# Patient Record
Sex: MALE | Race: WHITE | NOT HISPANIC OR LATINO | Employment: OTHER | URBAN - METROPOLITAN AREA
[De-identification: names, ages, dates, MRNs, and addresses within clinical notes are randomized per-mention and may not be internally consistent; named-entity substitution may affect disease eponyms.]

---

## 2019-07-18 ENCOUNTER — OFFICE VISIT (OUTPATIENT)
Dept: PODIATRY | Facility: CLINIC | Age: 44
End: 2019-07-18

## 2019-07-18 VITALS
DIASTOLIC BLOOD PRESSURE: 86 MMHG | HEIGHT: 69 IN | HEART RATE: 57 BPM | SYSTOLIC BLOOD PRESSURE: 133 MMHG | BODY MASS INDEX: 31.7 KG/M2 | WEIGHT: 214 LBS

## 2019-07-18 DIAGNOSIS — M76.61 ACHILLES TENDINITIS, RIGHT LEG: Primary | ICD-10-CM

## 2019-07-18 PROCEDURE — 99203 OFFICE O/P NEW LOW 30 MIN: CPT | Performed by: PODIATRIST

## 2019-07-18 RX ORDER — UBIDECARENONE 75 MG
50 CAPSULE ORAL DAILY
COMMUNITY

## 2019-07-18 RX ORDER — PHENOL 1.4 %
600 AEROSOL, SPRAY (ML) MUCOUS MEMBRANE DAILY
COMMUNITY

## 2019-07-18 RX ORDER — POTASSIUM CHLORIDE 1.5 G/1.77G
20 POWDER, FOR SOLUTION ORAL 2 TIMES DAILY
COMMUNITY

## 2019-07-18 NOTE — PROGRESS NOTES
07/18/2019  Foot and Ankle Surgery - New Patient   Provider: Dr. Jimenez Thompson DPM  Location: [facility]    Subjective:  Surinder Guerrero is a 44 y.o. male.     Chief Complaint   Patient presents with   • Right Ankle - Pain       HPI: Patient is a 44-year-old male that presents with history of right lower extremity discomfort.  He complains of pain all across the Achilles tendon over the last few months.  He denies any injury to the area.  He states that he did have fairly significant cramping and discomfort to the myotendinous junction region.  Symptoms have gradually improved because he has decreased his overall activity.  Today, he states that his pain is a 2 out of 10 but he is concerned about increasing activity for fear of tear or rupture.  He has not been formally treated for this issue.  He would like to gradually be able to return to baseline activity.    No Known Allergies    History reviewed. No pertinent past medical history.    Past Surgical History:   Procedure Laterality Date   • KNEE ARTHROSCOPY W/ ACL RECONSTRUCTION Right        Family History   Problem Relation Age of Onset   • Diabetes Maternal Grandfather        Social History     Socioeconomic History   • Marital status: Other     Spouse name: Not on file   • Number of children: Not on file   • Years of education: Not on file   • Highest education level: Not on file   Tobacco Use   • Smoking status: Never Smoker   Substance and Sexual Activity   • Alcohol use: Yes   • Drug use: No   • Sexual activity: Defer        Current Outpatient Medications on File Prior to Visit   Medication Sig Dispense Refill   • calcium carbonate (OS-LULÚ) 600 MG tablet Take 600 mg by mouth Daily.     • Ped Multivitamins-Fl-Iron (MULTIVITAMIN WITH FLUORIDE/IRON) 0.25-10 MG/ML solution solution Take  by mouth Daily.     • potassium chloride (KLOR-CON) 20 MEQ packet Take 20 mEq by mouth 2 (Two) Times a Day.     • vitamin B-12 (CYANOCOBALAMIN) 100 MCG tablet  "Take 50 mcg by mouth Daily.       No current facility-administered medications on file prior to visit.        Review of Systems:  General: Denies fever, chills, fatigue, and weakness.  Eyes: Denies vision loss, blurry vision, and excessive redness.  ENT: Denies hearing issues and difficulty swallowing.  Cardiovascular: Denies palpitations, chest pain, or syncopal episodes.  Respiratory: Denies shortness of breath, wheezing, and coughing.  GI: Denies abdominal pain, nausea, and vomiting.   : Denies frequency, hematuria, and urgency.  Musculoskeletal: + Right Achilles tendon discomfort  Derm: Denies rash, open wounds, or suspicious lesions.  Neuro: Denies headaches, numbness, loss of coordination, and tremors.  Psych: Denies anxiety and depression.  Endocrine: Denies temperature intolerance and changes in appetite.  Heme: Denies bleeding disorders or abnormal bruising.     Objective   /86   Pulse 57   Ht 175.3 cm (69\")   Wt 97.1 kg (214 lb)   BMI 31.60 kg/m²     General Exam  Appearance: appears stated age and healthy   Orientation: alert and oriented to person, place, and time   Affect: appropriate   Gait: unimpaired     Foot/Ankle Exam  Inspection and Palpation  Ecchymosis - Right: none   Tenderness - Right: (Mild discomfort at the level of the myotendinous junction of the Achilles tendon)   Swelling - Right: none     Arch - Right: normal   Hammertoes - Right: absent   Claw Toes - Right: absent   Mallet Toes - Right: absent   Hallux Valgus - Right: no   Hallux Limitus - Right: no   Skin - Right: skin intact      Vascular  Dorsalis Pedis - Right: 3+   Posterior Tibial - Right: 3+     Neurologic  Saphenous Nerve Sensation  - Right: normal   Tibial Nerve Sensation - Right: normal   Superficial Peroneal Nerve Sensation - Right: normal   Deep Peroneal Nerve Sensation - Right: normal   Sural Nerve Sensation - Right: normal   Achilles Reflex - Right: 2+     Muscle Strength  Dorsiflexion - Right: 5   Plantar " Flexion - Right: 5   Eversion - Right: 5   Inversion - Right: 5   Great Toe Extension - Right: 5   Great Toe Flexion - Right: 5     Range of Motion  Normal right ankle ROM    Right Foot/Ankle Comments  Prominent equinus contracture with knee extended and flexed.  Mild discomfort with palpation to the myotendinous junction.  No obvious defect or hypertrophy to the Achilles tendon.        Assessment/Plan   Surinder was seen today for pain.    Diagnoses and all orders for this visit:    Achilles tendinitis, right leg  -     Ambulatory Referral to Physical Therapy Evaluate and treat; Soft Tissue Mobilizaton; Stretching, Strengthening      Patient presents for recent onset of right lower leg pain.  He complains of most of the discomfort at the level of the Achilles tendon.  Today, he has no hypertrophy or defect concerning for tear or rupture.  I explained that his symptoms are likely secondary to well-developed posterior calf muscles.  We have reviewed proper stretching exercises at length.  I do feel that he would benefit from formal physical therapy and activity modification.  We did review low impact exercises.  He does understand his risk with high impact activities.  I reviewed rice therapy and proper use of anti-inflammatories.  I would like him to return in 6 weeks for reevaluation.    Orders Placed This Encounter   Procedures   • Ambulatory Referral to Physical Therapy Evaluate and treat; Soft Tissue Mobilizaton; Stretching, Strengthening     Referral Priority:   Routine     Referral Type:   Therapy     Referral Reason:   Specialty Services Required     Requested Specialty:   Physical Therapy     Number of Visits Requested:   1        Note is dictated utilizing voice recognition software. Unfortunately this leads to occasional typographical errors. I apologize in advance if the situation occurs. If questions occur please do not hesitate to call our office.

## 2019-07-23 ENCOUNTER — OFFICE VISIT (OUTPATIENT)
Dept: PHYSICAL THERAPY | Facility: CLINIC | Age: 44
End: 2019-07-23

## 2019-07-23 DIAGNOSIS — M76.61 ACHILLES TENDINITIS, RIGHT LEG: ICD-10-CM

## 2019-07-23 PROCEDURE — 97140 MANUAL THERAPY 1/> REGIONS: CPT | Performed by: PHYSICAL THERAPIST

## 2019-07-23 PROCEDURE — 97161 PT EVAL LOW COMPLEX 20 MIN: CPT | Performed by: PHYSICAL THERAPIST

## 2019-07-23 NOTE — PROGRESS NOTES
Physical Therapy Initial Evaluation and Plan of Care    Patient: Surinder Guerrero   : 1975  Diagnosis/ICD-10 Code:  No primary diagnosis found.  Referring practitioner: DANIELLE Thompson DPM  Date of Initial Visit: 2019  Today's Date: 2019  Patient seen for 1 sessions           Subjective Questionnaire: FAAM: 90%, sports subscale 29%      Subjective Evaluation    History of Present Illness  Mechanism of injury: Patient reports insidious onset of R achilles/calf pain in Dec 2018. Running 3.5 miles ~5x per week; participated in mini marathons etc. Pain with initial standing, usually went away with running. Worsened to point where hurt more constantly ~2019. Worse with hills. No issues with walking or stairs. Took 3 weeks off from running. He reports tightness at rest, muscle spasms. Hx includes ACL repair R knee, multiple sprained ankles. He stopped playing basketball due to fear of achilles injury. Better with ice and stretching. No change in shoewear (neutral running shoe) and is a forefoot runner, does not typically stretch but has been since seeing MD (ITB, HS, quad, runners stretch, gastroc stretch at stair).    MD follow up 6 weeks      Patient Occupation:  Quality of life: good    Pain  Current pain rating: 3  At best pain ratin  At worst pain ratin  Location: R achilles/calf  Quality: tight, pulling and dull ache  Relieving factors: ice and rest  Progression: improved    Social Support  Lives with: spouse    Patient Goals  Patient goals for therapy: decreased pain, increased motion, increased strength, independence with ADLs/IADLs and return to sport/leisure activities  Patient goal: return to running           Objective       Palpation     Right   Hypertonic in the lateral gastrocnemius. Tenderness of the medial gastrocnemius.     Additional Palpation Details          Tenderness     Right Ankle/Foot   Tenderness in the Achilles insertion.     Additional  Tenderness Details  Mid substance achilles R.  Fascia restriction noted throughout medial >lateral calf.     Neurological Testing     Sensation     Ankle/Foot     Right Ankle/Foot   Intact: light touch     Active Range of Motion   Left Ankle/Foot   Dorsiflexion (ke): 4 degrees   Dorsiflexion (kf): 5 degrees   Inversion: 41 degrees   Eversion: 18 degrees     Right Ankle/Foot   Dorsiflexion (ke): 0 degrees   Dorsiflexion (kf): 0 degrees     Additional Active Range of Motion Details  Knee lacking 7 degrees ext supine R.    Functional hip ER sitting WFL bilat. Gross screen WFL L knee bilat hip except mild hip ext restriction.     Joint Play     Additional Joint Play Details  Heel raise R able but weak and wobbly.   Navicular drop      Walking and stairs are ok  [place] walking are ok, running painful        Strength/Myotome Testing     Left Ankle/Foot   Dorsiflexion: 4+  Plantar flexion: 4+  Inversion: 4+  Eversion: 4+  Great toe flexion: 4+  Great toe extension: 4+    Right Ankle/Foot   Dorsiflexion: 4+  Plantar flexion: 4- (mild pain mid substance R calf)  Inversion: 4+  Eversion: 4+  Great toe extension: 4+    Additional Strength Details  LE strength 4+/5 throughout, hip ext 4-/5 bilat, hip abd strong bilat sidelying    Standing single heel rise L  X 5 with good control. R painfree with poor control x 3.    General Comments     Ankle/Foot Comments   No significant edema    Ambulation: no antalgia, unremarkable  Stairs: not observed  Jogging: will assess at later date.         Assessment & Plan     Assessment  Assessment details: Pt demonstrates fascia restrictions, ROM restrictions, flexibility impairments, muscle strength impairments, tenderness to palpation distal achilles insertion and mid substance musculotendinous junction R calf. Symptoms interfere with running and active lifestyle. He wants to avoid more serious achilles injury such as rupture. He would benefit from skilled PT to address impairments and maximize  function.  Prognosis: good    Goals  Plan Goals: STG  1) Pt independent with HEP in 2 visits  2) R ankle DF 8 degrees in 3 weeks  3) Decrease fascia restriction R calf in 3 weeks for increased ROM/decreased pain   4) Pt to tolerate progression of HEP in 3 weeks    LT) Improved FAAM sports score by D/C  2) Pt to demonstrate R single heel raise x 5 with good control  3) Pt to return to 3.5 miles jogging without aggravation of symptoms by D/C  4) Pt to voice readiness for D/C to independent HEP and self management of symptoms.      Plan  Therapy options: will be seen for skilled physical therapy services  Planned modality interventions: cryotherapy, electrical stimulation/[place] stimulation, ultrasound and TENS  Planned therapy interventions: stretching, strengthening, soft tissue mobilization, neuromuscular re-education, manual therapy, motor coordination training, home exercise program, functional ROM exercises, gait training and balance/weight-bearing training  Duration in visits: 10  Treatment plan discussed with: patient      Instructed in HEP, provided handouts. Pt verbalizes understanding and good initial tolerance to exercises.    Timed:         Manual Therapy:    10     mins  37137;     Therapeutic Exercise:    5     mins  73155;     Neuromuscular Tamara:        mins  67168;    Therapeutic Activity:          mins  43527;     Gait Training:           mins  91354;     Ultrasound:          mins  98073;    Ionto                                   mins   65344  Self Care                            mins   53257  Aquatic                               mins 00676      Un-Timed:  Electrical Stimulation:         mins  74386 ( );  Dry Needling          mins self-pay  Traction          mins 46011  Low Eval     30     Mins  66583  Mod Eval          Mins  29249  High Eval                            Mins  59609  Re-Eval                               mins  40834        Timed Treatment:   15   mins   Total Treatment:      45   mins    PT SIGNATURE: Alejandra Mnea, PT   DATE TREATMENT INITIATED: 7/23/2019    Initial Certification  Certification Period: 10/21/2019  I certify that the therapy services are furnished while this patient is under my care.  The services outlined above are required by this patient, and will be reviewed every 90 days.     PHYSICIAN: DANIELLE Thompson, DPM      DATE:     Please sign and return via fax to  .. Thank you, [facility].

## 2019-07-26 ENCOUNTER — OFFICE VISIT (OUTPATIENT)
Dept: PHYSICAL THERAPY | Facility: CLINIC | Age: 44
End: 2019-07-26

## 2019-07-26 DIAGNOSIS — M76.61 ACHILLES TENDINITIS, RIGHT LEG: Primary | ICD-10-CM

## 2019-07-26 PROCEDURE — 97110 THERAPEUTIC EXERCISES: CPT | Performed by: PHYSICAL THERAPIST

## 2019-07-26 PROCEDURE — 97140 MANUAL THERAPY 1/> REGIONS: CPT | Performed by: PHYSICAL THERAPIST

## 2019-07-26 NOTE — PROGRESS NOTES
Physical Therapy Daily Progress Note    VISIT#: 2    Subjective   Surinder Guerrero reports: doing fine. No new issues. Likes HEP stretches, especially soleus stretch; doing 3x per day. Seems to be helping some already.      Objective     See Exercise, Manual, and Modality Logs for complete treatment.     Assessment & Plan     Assessment  Assessment details: Good tolerance to treatment. Fascia restriction medial>lateral calf.      Progress per Plan of Care Monitor and progress as tolerated. Add ankle 4 way.          Timed:         Manual Therapy:    14  mins  34583;     Therapeutic Exercise:    26  mins  07968;     Neuromuscular Tamara:        mins  68833;    Therapeutic Activity:          mins  26567;     Gait Training:           mins  55703;     Ultrasound:          mins  32762;    Ionto                                   mins   95528  Self Care                            mins   22636  Canalith Repos                   mins  4209  Aquatic                               mins 71190    Un-Timed:  Electrical Stimulation:         mins  70521 ( );  Dry Needling          mins self-pay  Traction          mins 87343  Low Eval          Mins  24469  Mod Eval          Mins  61883  High Eval                            Mins  80812  Re-Eval                               mins  87807    Timed Treatment:   40   mins   Total Treatment:     40   mins    Aljeandra Mena, PT

## 2019-07-30 ENCOUNTER — OFFICE VISIT (OUTPATIENT)
Dept: PHYSICAL THERAPY | Facility: CLINIC | Age: 44
End: 2019-07-30

## 2019-07-30 DIAGNOSIS — M76.61 ACHILLES TENDINITIS, RIGHT LEG: Primary | ICD-10-CM

## 2019-07-30 PROCEDURE — 97110 THERAPEUTIC EXERCISES: CPT | Performed by: PHYSICAL THERAPIST

## 2019-07-30 PROCEDURE — 97140 MANUAL THERAPY 1/> REGIONS: CPT | Performed by: PHYSICAL THERAPIST

## 2019-07-30 NOTE — PROGRESS NOTES
Physical Therapy Daily Progress Note    VISIT#: 3/10 in POC.     Subjective   Surinder Guerrero reports: Pt. Did a lot of walking this weekend and notes muscle tightness in right medial calf.       Objective   Hx includes ACL repair R knee, multiple sprained ankles.  See Exercise, Manual, and Modality Logs for complete treatment. Progressed HEP    Patient Education:    Assessment/Plan:  Pt. Tolerated progression well.       Progress per Plan of Care:  Monitor response to treatment and Hep progression.             Timed:         Manual Therapy: 15        mins  16421;     Therapeutic Saneiaer63    mins  39465;     Neuromuscular Tamara:        mins  43656;    Therapeutic Activity:          mins  63791;     Gait Training:           mins  82633;     Ultrasound:          mins  34507;    Ionto                                   mins   55895  Self Care                            mins   01057  Canalith Repos                   mins  4209  Aquatic                               mins 86575    Un-Timed:  Electrical Stimulation:         mins  21337 ( );  Dry Needling          mins self-pay  Traction          mins 19467  Low Eval          Mins  26420  Mod Eval          Mins  96946  High Eval                            Mins  45159  Re-Eval                               mins  05991    Timed Treatment:  50    mins   Total Treatment:     50   mins    Paulette Arreguin, PTA

## 2019-08-01 ENCOUNTER — OFFICE VISIT (OUTPATIENT)
Dept: PHYSICAL THERAPY | Facility: CLINIC | Age: 44
End: 2019-08-01

## 2019-08-01 DIAGNOSIS — M76.61 ACHILLES TENDINITIS, RIGHT LEG: Primary | ICD-10-CM

## 2019-08-01 PROCEDURE — 97110 THERAPEUTIC EXERCISES: CPT | Performed by: PHYSICAL THERAPIST

## 2019-08-01 PROCEDURE — 97140 MANUAL THERAPY 1/> REGIONS: CPT | Performed by: PHYSICAL THERAPIST

## 2019-08-01 NOTE — PROGRESS NOTES
Physical Therapy Daily Progress Note    VISIT#: 4    Subjective   Surinder Guerrero reports: tolerating therapy well so far. All exercises painfree. achilles insertion feels better, still gets feeling of tightness mid calf. Had cramp with ankle 4 way this morning but resolved after rest. Doing HEP and ice bath at home.       Objective     See Exercise, Manual, and Modality Logs for complete treatment.     Pt with mild indentation at mid calf. Pt reports at edge of work boots, but will monitor. Mild edema achilles.    Assessment & Plan     Assessment  Assessment details: Tolerating therapy well without aggravation of symptoms. Significant fascia restriction R medial > lateral calf.      Progress per Plan of Care.            Timed:         Manual Therapy:    15     mins  73684;     Therapeutic Exercise:    30     mins  40023;     Neuromuscular Tamara:        mins  77964;    Therapeutic Activity:          mins  88124;     Gait Training:           mins  70686;     Ultrasound:          mins  16216;    Ionto                                   mins   41244  Self Care                            mins   53466  Canalith Repos                   mins  4209  Aquatic                               mins 30199    Un-Timed:  Electrical Stimulation:         mins  41436 ( );  Dry Needling          mins self-pay  Traction          mins 98064  Low Eval          Mins  46327  Mod Eval          Mins  37752  High Eval                            Mins  87184  Re-Eval                               mins  50265    Timed Treatment:   45   mins   Total Treatment:     45   mins    Alejandra Mena, PT

## 2019-08-05 ENCOUNTER — OFFICE VISIT (OUTPATIENT)
Dept: PHYSICAL THERAPY | Facility: CLINIC | Age: 44
End: 2019-08-05

## 2019-08-05 DIAGNOSIS — M76.61 ACHILLES TENDINITIS, RIGHT LEG: Primary | ICD-10-CM

## 2019-08-05 PROCEDURE — 97110 THERAPEUTIC EXERCISES: CPT | Performed by: PHYSICAL THERAPIST

## 2019-08-05 PROCEDURE — 97140 MANUAL THERAPY 1/> REGIONS: CPT | Performed by: PHYSICAL THERAPIST

## 2019-08-05 PROCEDURE — 97112 NEUROMUSCULAR REEDUCATION: CPT | Performed by: PHYSICAL THERAPIST

## 2019-08-05 NOTE — PROGRESS NOTES
Physical Therapy Daily Progress Note    VISIT#: 5    Subjective   Surinder Guerrero reports: no pain, feeling a lot better achilles, still gets tighness mid substance calf, intermittent pulsing/spams bulk of calf yesterday, did ankle 4way with TB 3 times yesterday and increased the reps. Feeling stronger. Pain ranges 0/10 to 3/10. Did one light jog with tightness but has held off on running.    Objective       Active Range of Motion     Right Ankle/Foot   Dorsiflexion (ke): 6 degrees   Dorsiflexion (kf): 5 degrees   Plantar flexion: WFL  Inversion: 41 degrees   Eversion: 31 degrees   Great toe extension: WFL     Knee ext lacking 5 degrees.     See Exercise, Manual, and Modality Logs for complete treatment.     Corrected Hep- encouraging pt to stretch 2-3 daily, but strengthening 1x day/every other day.    Assessment & Plan     Assessment  Assessment details: Less fascia restriction today medial calf. Mild defect medial musculotendinous junction. No significant pain/tenderness at this area, mild at achilles tendon.  Better NM control with ankle 4 way, poor NM control with PF+ PF today. All exercises painfree. Pt reports less tightness/stiffness and better overall post treatment. Making improvement in ROM.      Progress per Plan of Care. Monitor and progress as tolerated.            Timed:         Manual Therapy:    20     mins  60270;     Therapeutic Exercise:    13     mins  05843;     Neuromuscular Tamara:  10      mins  03469;    Therapeutic Activity:          mins  61058;     Gait Training:           mins  55651;     Ultrasound:          mins  64380;    Ionto                                   mins   31695  Self Care                            mins   74757  Canalith Repos                   mins  4209  Aquatic                               mins 99492    Un-Timed:  Electrical Stimulation:         mins  19377 ( );  Dry Needling          mins self-pay  Traction          mins 69993  Low Eval          Mins   28773  Mod Eval          Mins  76353  High Eval                            Mins  70644  Re-Eval                               mins  37538    Timed Treatment:   43   mins   Total Treatment:     43   mins    Alejandra Mena, PT

## 2019-08-08 ENCOUNTER — OFFICE VISIT (OUTPATIENT)
Dept: PHYSICAL THERAPY | Facility: CLINIC | Age: 44
End: 2019-08-08

## 2019-08-08 DIAGNOSIS — M76.61 ACHILLES TENDINITIS, RIGHT LEG: Primary | ICD-10-CM

## 2019-08-08 PROCEDURE — 97110 THERAPEUTIC EXERCISES: CPT | Performed by: PHYSICAL THERAPIST

## 2019-08-08 PROCEDURE — 97140 MANUAL THERAPY 1/> REGIONS: CPT | Performed by: PHYSICAL THERAPIST

## 2019-08-08 NOTE — PROGRESS NOTES
Physical Therapy Daily Progress Note    VISIT#: 6    Subjective   Surinder Guerrero reports: calf feeling a lot better, less tightness.     Objective     See Exercise, Manual, and Modality Logs for complete treatment.     Assessment & Plan     Assessment  Assessment details: Pt continues to make progress toward goals. All exercises painfree. DF ROM deficits remain but improving.      Progress per Plan of Care Elliptical next visit.          Timed:         Manual Therapy:    20     mins  63885;     Therapeutic Exercise:    30     mins  13030;     Neuromuscular Tamara:        mins  56499;    Therapeutic Activity:          mins  66613;     Gait Training:           mins  02669;     Ultrasound:          mins  12020;    Ionto                                   mins   01849  Self Care                            mins   48863  Canalith Repos                   mins  4209  Aquatic                               mins 00350    Un-Timed:  Electrical Stimulation:         mins  73909 ( );  Dry Needling          mins self-pay  Traction          mins 76186  Low Eval          Mins  62222  Mod Eval          Mins  13274  High Eval                            Mins  12463  Re-Eval                               mins  63385    Timed Treatment:   50   mins   Total Treatment:     50   mins    Alejandra Mena, PT

## 2019-08-13 ENCOUNTER — OFFICE VISIT (OUTPATIENT)
Dept: PHYSICAL THERAPY | Facility: CLINIC | Age: 44
End: 2019-08-13

## 2019-08-13 DIAGNOSIS — M76.61 ACHILLES TENDINITIS, RIGHT LEG: Primary | ICD-10-CM

## 2019-08-13 PROCEDURE — 97110 THERAPEUTIC EXERCISES: CPT | Performed by: PHYSICAL THERAPIST

## 2019-08-13 PROCEDURE — 97140 MANUAL THERAPY 1/> REGIONS: CPT | Performed by: PHYSICAL THERAPIST

## 2019-08-13 NOTE — PROGRESS NOTES
Physical Therapy Daily Progress Note    VISIT#: 7    Subjective   Chrisdeni Guerrero reports: calf and achilles had been feeling a lot better, best it has in a long time following last visit. Did some mountain biking yesterday so a little sore/tight today, but tolerated without pain.     Objective     See Exercise, Manual, and Modality Logs for complete treatment.     Assessment & Plan     Assessment  Assessment details: Gastroc length still limited, but making functional progress toward goals. All exercises painfree.       Progress per Plan of Care Progress to jogging next visit as tolerated.            Timed:         Manual Therapy:    20     mins  63099;     Therapeutic Exercise:    25     mins  37250;     Neuromuscular Tamara:        mins  52443;    Therapeutic Activity:          mins  20418;     Gait Training:           mins  18177;     Ultrasound:          mins  78926;    Ionto                                   mins   01393  Self Care                            mins   08342  Canalith Repos                   mins  4209  Aquatic                               mins 81330    Un-Timed:  Electrical Stimulation:         mins  75294 ( );  Dry Needling          mins self-pay  Traction          mins 85442  Low Eval          Mins  44633  Mod Eval          Mins  00198  High Eval                            Mins  54760  Re-Eval                               mins  35982    Timed Treatment:   45   mins   Total Treatment:     45   mins    Alejandra Mena, PT

## 2019-08-15 ENCOUNTER — OFFICE VISIT (OUTPATIENT)
Dept: PHYSICAL THERAPY | Facility: CLINIC | Age: 44
End: 2019-08-15

## 2019-08-15 DIAGNOSIS — M76.61 ACHILLES TENDINITIS, RIGHT LEG: Primary | ICD-10-CM

## 2019-08-15 PROCEDURE — 97140 MANUAL THERAPY 1/> REGIONS: CPT | Performed by: PHYSICAL THERAPIST

## 2019-08-15 PROCEDURE — 97112 NEUROMUSCULAR REEDUCATION: CPT | Performed by: PHYSICAL THERAPIST

## 2019-08-15 PROCEDURE — 97110 THERAPEUTIC EXERCISES: CPT | Performed by: PHYSICAL THERAPIST

## 2019-08-15 NOTE — PROGRESS NOTES
Physical Therapy Daily Progress Note    VISIT#: 8    Subjective   Surinder Guerrero reports: been doing a lot of biking for work this week, 23 miles tomorrow. Still doing better.     Objective     See Exercise, Manual, and Modality Logs for complete treatment.     Assessment/Plan Held jogging, will consider next week. ROM and flexibility continues to slowly improve. All exercises painfree, mild fatigue. Pt reports feeling better ROM post treatment. Significantly increased sway with eyes closed single leg balance than EO.    Progress per Plan of Care           Timed:         Manual Therapy:    20     mins  38359;     Therapeutic Exercise:    15     mins  44511;     Neuromuscular Taamra: 10       mins  94266;    Therapeutic Activity:          mins  15799;     Gait Training:           mins  92934;     Ultrasound:          mins  18300;    Ionto                                   mins   95590  Self Care                            mins   83153  Canalith Repos                   mins  4209  Aquatic                               mins 77926    Un-Timed:  Electrical Stimulation:         mins  52690 ( );  Dry Needling          mins self-pay  Traction          mins 47686  Low Eval          Mins  69664  Mod Eval          Mins  69914  High Eval                            Mins  59017  Re-Eval                               mins  82897    Timed Treatment:   45   mins   Total Treatment:     45   mins    Alejandra Mena, PT

## 2019-08-19 ENCOUNTER — OFFICE VISIT (OUTPATIENT)
Dept: PHYSICAL THERAPY | Facility: CLINIC | Age: 44
End: 2019-08-19

## 2019-08-19 DIAGNOSIS — M76.61 ACHILLES TENDINITIS, RIGHT LEG: Primary | ICD-10-CM

## 2019-08-19 PROCEDURE — 97110 THERAPEUTIC EXERCISES: CPT | Performed by: PHYSICAL THERAPIST

## 2019-08-19 PROCEDURE — 97140 MANUAL THERAPY 1/> REGIONS: CPT | Performed by: PHYSICAL THERAPIST

## 2019-08-19 NOTE — PROGRESS NOTES
"Physical Therapy Daily Progress Note    VISIT#: 9/10    Subjective   Surinder Guerrero reports: had episode of \"charliehorse\" overnight over the weekend. Resolved. He relates to bike training at work all of last week. No pain currently. \"Everything feeling a lot better.\" No longer gets stiffness with initial standing after prolonged sitting.      Objective     See Exercise, Manual, and Modality Logs for complete treatment.       Assessment/Plan Good single leg heel raise with good control, painfree. Single leg balance EC with difficulty.       Progress per Plan of Care          Timed:         Manual Therapy: 18     mins  42171;     Therapeutic Exercise:   25     mins  19419;     Neuromuscular Tamara:    5    mins  10156;    Therapeutic Activity:          mins  75009;     Gait Training:           mins  76238;     Ultrasound:          mins  47417;    Ionto                                   mins   97244  Self Care                            mins   85802  Canalith Repos                   mins  4209  Aquatic                               mins 47454    Un-Timed:  Electrical Stimulation:         mins  13244 ( );  Dry Needling          mins self-pay  Traction          mins 86451  Low Eval          Mins  73553  Mod Eval          Mins  18493  High Eval                            Mins  05184  Re-Eval                               mins  35262    Timed Treatment: 48  mins   Total Treatment:     48   mins    Alejandra Mena, PT  "

## 2019-08-23 ENCOUNTER — OFFICE VISIT (OUTPATIENT)
Dept: PHYSICAL THERAPY | Facility: CLINIC | Age: 44
End: 2019-08-23

## 2019-08-23 DIAGNOSIS — M76.61 ACHILLES TENDINITIS, RIGHT LEG: Primary | ICD-10-CM

## 2019-08-23 PROCEDURE — 97110 THERAPEUTIC EXERCISES: CPT | Performed by: PHYSICAL THERAPIST

## 2019-08-23 PROCEDURE — 97140 MANUAL THERAPY 1/> REGIONS: CPT | Performed by: PHYSICAL THERAPIST

## 2019-08-23 NOTE — PROGRESS NOTES
Re-Assessment / Re-Certification        Patient: Surinder Guerrero   : 1975  Diagnosis/ICD-10 Code:  Achilles tendinitis, right leg [M76.61]  Referring practitioner: DANIELLE Thompson DPM  Date of Initial Visit: Type: THERAPY  Noted: 2019  Today's Date: 2019  Patient seen for 10 sessions      Subjective:   Surinder Guerrero reports: Feeling a lot better, soreness and tightness mid calf and achilles is gone, mild tenderness behind lateral malleolus. Transition from sit to stand is much better. Plans to wait until MD follow up appt before resuming running. He tolerates biking, walking, and light jog without issue. Doing well with HEP.    Subjective Questionnaire: FAAM: 100% , sports 100%, self report 95%-- (FAAM: 90%, sports subscale 29% at eval)  Clinical Progress: improved  Home Program Compliance: Yes  Treatment has included: therapeutic exercise, manual therapy, therapeutic activity, gait training and cryotherapy    Subjective   Objective       Palpation     Additional Palpation Details          Tenderness     Right Ankle/Foot   No tenderness in the Achilles insertion.     Additional Tenderness Details    Fascia restriction noted throughout medial >lateral calf, mild at achilles insertion.    Neurological Testing     Sensation     Ankle/Foot     Right Ankle/Foot   Intact: light touch     Active Range of Motion   Left Ankle/Foot   Dorsiflexion (ke): 4 degrees   Dorsiflexion (kf): 5 degrees   Inversion: 41 degrees   Eversion: 18 degrees     Right Ankle/Foot   Dorsiflexion (ke): 10 degrees   Dorsiflexion (kf): 7 degrees   Inversion: 10 degrees   Eversion: 17 degrees     Additional Active Range of Motion Details  Knee lacking -2 degrees ext supine R.    Functional hip ER sitting WFL bilat. Gross screen WFL L knee bilat hip except mild hip ext restriction.     Joint Play     Right Ankle/Foot  Hypomobile in the talocrural joint and subtalar joint.     Additional Joint Play Details  Improved from  "eval.      Strength/Myotome Testing     Left Ankle/Foot   Dorsiflexion: 4+  Plantar flexion: 4+  Inversion: 4+  Eversion: 4+  Great toe flexion: 4+  Great toe extension: 4+    Right Ankle/Foot   Dorsiflexion: 4+  Plantar flexion: 4 (painfree)  Inversion: 4+  Eversion: 4+  Great toe extension: 4+    Additional Strength Details  LE strength 4+/5 throughout, hip ext 4/5 bilat, hip abd strong bilat sidelying  Painfree throughout.    Standing single heel rise L  X 10 bilat with good control, mild sway R, less control than L.    General Comments     Ankle/Foot Comments   No significant edema    Ambulation: no antalgia, unremarkable  Stairs: unremmarkable  Jogging: will assess at later date.    Single leg balance  EO 60\" bilat    EC 15\"/ 32\" R and 24\" L        Assessment & Plan     Assessment  Assessment details: Pt with significant improvement in ROM, flexibility, capsular mobility, accesory motion, strength, balance. He continue to have mild soleus restriction with medial calf fascia restriction, mild strength deficit R compared to L. He is tolerating therapy well and notes significant improvement with ADLs, has not yet returned to PLOF with running. Recommend continued PT to address remaining impairments and maximize function.    Goals  Plan Goals: STG  1) Pt independent with HEP in 2 visits - MET  2) R ankle DF 8 degrees in 3 weeks - MET  3) Decrease fascia restriction R calf in 3 weeks for increased ROM/decreased pain - PARTIALLY MET  4) Pt to tolerate progression of HEP in 3 weeks - MET    LT) Improved FAAM sports score by D/C - MET  2) Pt to demonstrate R single heel raise x 5 with good control - MET  3) Pt to return to 3.5 miles jogging without aggravation of symptoms by D/C - NOT MET  4) Pt to voice readiness for D/C to independent HEP and self management of symptoms. - NOT MET        Progress toward previous goals: Partially Met        Recommendations: Continue as planned  Timeframe: 1x per week for 3 " visits.   Prognosis to achieve goals: good    PT Signature: Alejandra Mena, PT      Based upon review of the patient's progress and continued therapy plan, it is my medical opinion that Surinder Guerrero should continue physical therapy treatment at [facility]  [address] [facility] [place] [address] [phone].    Signature: __________________________________  DANIELLE Thompson DPM    Timed:         Manual Therapy:    20    mins  50651;     Therapeutic Exercise:    25      mins  37656;     Neuromuscular Tamara:      mins  37897;    Therapeutic Activity:          mins  10125;     Gait Training:           mins  88810;     Ultrasound:          mins  49218;    Ionto                                   mins   73447  Self Care                            mins   68921  Aquatic                               mins 05668      Un-Timed:  Electrical Stimulation:         mins  45508 ( );  Dry Needling          mins self-pay  Traction          mins 10927  Low Eval          Mins  09090  Mod Eval          Mins  99085  High Eval                            Mins  80148  Re-Eval                               mins  40772      Timed Treatment:   45   mins   Total Treatment:     45   mins

## 2019-08-26 ENCOUNTER — OFFICE VISIT (OUTPATIENT)
Dept: PODIATRY | Facility: CLINIC | Age: 44
End: 2019-08-26

## 2019-08-26 VITALS
DIASTOLIC BLOOD PRESSURE: 80 MMHG | HEIGHT: 69 IN | HEART RATE: 67 BPM | BODY MASS INDEX: 32.14 KG/M2 | WEIGHT: 217 LBS | SYSTOLIC BLOOD PRESSURE: 115 MMHG

## 2019-08-26 DIAGNOSIS — M76.61 ACHILLES TENDINITIS, RIGHT LEG: Primary | ICD-10-CM

## 2019-08-26 PROCEDURE — 99213 OFFICE O/P EST LOW 20 MIN: CPT | Performed by: PODIATRIST

## 2019-08-26 NOTE — PROGRESS NOTES
"08/26/2019  Foot and Ankle Surgery - Established Patient/Follow-up  Provider: Dr. Jimenez Thompson DPM  Location: [facility]    Subjective:  Surinder Guerrero is a 44 y.o. male.     Chief Complaint   Patient presents with   • Right Ankle - Follow-up       HPI: Returns for follow-up on right Achilles tendinitis.  He states that he is doing substantially better after physical therapy.  He has been able to return to baseline activity without significant pain or limitation.  He denies any other issues today.    No Known Allergies    Current Outpatient Medications on File Prior to Visit   Medication Sig Dispense Refill   • calcium carbonate (OS-LULÚ) 600 MG tablet Take 600 mg by mouth Daily.     • Ped Multivitamins-Fl-Iron (MULTIVITAMIN WITH FLUORIDE/IRON) 0.25-10 MG/ML solution solution Take  by mouth Daily.     • potassium chloride (KLOR-CON) 20 MEQ packet Take 20 mEq by mouth 2 (Two) Times a Day.     • vitamin B-12 (CYANOCOBALAMIN) 100 MCG tablet Take 50 mcg by mouth Daily.       No current facility-administered medications on file prior to visit.        Objective   /80   Pulse 67   Ht 175.3 cm (69\")   Wt 98.4 kg (217 lb)   BMI 32.05 kg/m²     General Exam  Appearance: appears stated age and healthy   Orientation: alert and oriented to person, place, and time   Affect: appropriate   Gait: unimpaired      Foot/Ankle Exam  Inspection and Palpation  Ecchymosis - Right: none   Tenderness - Right: (Mild discomfort at the level of the myotendinous junction of the Achilles tendon)   Swelling - Right: none     Arch - Right: normal   Hammertoes - Right: absent   Claw Toes - Right: absent   Mallet Toes - Right: absent   Hallux Valgus - Right: no   Hallux Limitus - Right: no   Skin - Right: skin intact       Vascular  Dorsalis Pedis - Right: 3+   Posterior Tibial - Right: 3+      Neurologic  Saphenous Nerve Sensation  - Right: normal   Tibial Nerve Sensation - Right: normal   Superficial Peroneal Nerve " Sensation - Right: normal   Deep Peroneal Nerve Sensation - Right: normal   Sural Nerve Sensation - Right: normal   Achilles Reflex - Right: 2+      Muscle Strength  Dorsiflexion - Right: 5   Plantar Flexion - Right: 5   Eversion - Right: 5   Inversion - Right: 5   Great Toe Extension - Right: 5   Great Toe Flexion - Right: 5      Range of Motion  Normal right ankle ROM     Right Foot/Ankle Comments  Tightness has improved.  No significant pain or limitation.    Assessment/Plan   Surinder was seen today for follow-up.    Diagnoses and all orders for this visit:    Achilles tendinitis, right leg      Patient is doing much better at this time.  He has responded well to physical therapy.  I have asked that he continue at home exercises.  He may gradually return to baseline activity.  I would like him to call with any additional issues or concerns.  I will see him on an as-needed basis.    No orders of the defined types were placed in this encounter.         Note is dictated utilizing voice recognition software. Unfortunately this leads to occasional typographical errors. I apologize in advance if the situation occurs. If questions occur please do not hesitate to call our office.

## 2019-10-11 ENCOUNTER — DOCUMENTATION (OUTPATIENT)
Dept: PHYSICAL THERAPY | Facility: CLINIC | Age: 44
End: 2019-10-11

## 2019-10-11 DIAGNOSIS — M76.61 ACHILLES TENDINITIS, RIGHT LEG: Primary | ICD-10-CM

## 2019-10-11 NOTE — PROGRESS NOTES
Discharge Summary  Discharge Summary from Physical Therapy Report      Dates  PT visit: 7/23/19 to 8/23/19  Number of Visits: 10     Discharge Status of Patient: See MD Note dated 8/23/19 for most recent info    Goals: Partially Met/Met    Discharge Plan: Continue with current home exercise program as instructed    Comments: Called pt following MD follow up to discuss status, schedule appts for HEP progression as needed, but no return call. D/C per clinic policy at this time.     Date of Discharge 10/11/19        Alejandra Mena, PT  Physical Therapist

## 2021-01-22 ENCOUNTER — OFFICE VISIT (OUTPATIENT)
Dept: ORTHOPEDIC SURGERY | Facility: CLINIC | Age: 46
End: 2021-01-22

## 2021-01-22 VITALS
HEIGHT: 69 IN | SYSTOLIC BLOOD PRESSURE: 131 MMHG | WEIGHT: 232 LBS | HEART RATE: 52 BPM | DIASTOLIC BLOOD PRESSURE: 85 MMHG | BODY MASS INDEX: 34.36 KG/M2

## 2021-01-22 DIAGNOSIS — S43.51XA SPRAIN OF ACROMIOCLAVICULAR JOINT, RIGHT, INITIAL ENCOUNTER: ICD-10-CM

## 2021-01-22 DIAGNOSIS — M25.511 ACUTE PAIN OF RIGHT SHOULDER: Primary | ICD-10-CM

## 2021-01-22 PROCEDURE — 99203 OFFICE O/P NEW LOW 30 MIN: CPT | Performed by: FAMILY MEDICINE

## 2021-01-22 RX ORDER — MELOXICAM 15 MG/1
15 TABLET ORAL DAILY PRN
Qty: 30 TABLET | Refills: 4 | Status: SHIPPED | OUTPATIENT
Start: 2021-01-22

## 2021-01-22 NOTE — PROGRESS NOTES
"Primary Care Sports Medicine Office Visit Note     Patient ID: Surinder Guerrero is a 45 y.o. male.    Chief Complaint:  Chief Complaint   Patient presents with   • Right Shoulder - Pain     HPI:    Mr. Surinder Guerrero is a 45 y.o. RHD male who presents to the clinic today for R shoulder pain. He started to notice insidious onset achy pain. NO Worse with overhead presses, kettle bell swings. Feels mildly unstable overhead, and mildly weaker than he usually feels. Pain nocturnally. Mild loss of strength in R arm > L. 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 Is active, lifts weights. Points to his AC joint when describing his pain. Has attempted IBU and icing, that helps mildly but has not alleviated the pain.     No past medical history on file.    Past Surgical History:   Procedure Laterality Date   • KNEE ARTHROSCOPY W/ ACL RECONSTRUCTION Right        Family History   Problem Relation Age of Onset   • Diabetes Maternal Grandfather      Social History     Occupational History   • Not on file   Tobacco Use   • Smoking status: Never Smoker   Substance and Sexual Activity   • Alcohol use: Yes   • Drug use: No   • Sexual activity: Defer      Review of Systems   Constitutional: Negative for activity change and fever.   Respiratory: Negative for cough and shortness of breath.    Cardiovascular: Negative for chest pain.   Gastrointestinal: Negative for constipation, diarrhea, nausea and vomiting.   Musculoskeletal: Positive for arthralgias.   Skin: Negative for color change and rash.   Neurological: Negative for weakness.   Hematological: Does not bruise/bleed easily.     Objective:    /85   Pulse 52   Ht 175.3 cm (69\")   Wt 105 kg (232 lb)   BMI 34.26 kg/m²     Physical Examination:  Physical Exam  Vitals signs and nursing note reviewed.   Constitutional:       General: He is not in acute distress.     Appearance: He is well-developed. He is not diaphoretic. "   HENT:      Head: Normocephalic and atraumatic.   Eyes:      Conjunctiva/sclera: Conjunctivae normal.   Pulmonary:      Effort: Pulmonary effort is normal. No respiratory distress.   Skin:     General: Skin is warm.      Capillary Refill: Capillary refill takes less than 2 seconds.   Neurological:      Mental Status: He is alert.       Right Shoulder Exam     Tenderness   The patient is experiencing tenderness in the acromioclavicular joint.    Range of Motion   Active abduction: normal   Passive abduction: normal   Extension: normal   External rotation: normal   Forward flexion: normal   Internal rotation 0 degrees:  Mid thoracic normal     Muscle Strength   Abduction: 5/5   Internal rotation: 5/5   External rotation: 5/5   Supraspinatus: 5/5   Subscapularis: 5/5   Biceps: 5/5     Tests   Fernandes test: negative  Impingement: negative  Sulcus: absent    Other   Erythema: absent  Sensation: normal  Pulse: present    Comments:  Negative Ayah, negative resisted external rotation, negative liftoff.  Negative Borden's. Strongly positive scarf.  Negative Neer.  Negative speeds/Yergason's.      Cervical range of motion is full with no tenderness to palpation to the bony midline or paraspinal cervical musculature.  Spurling's maneuver to the right is negative.          Imaging and other tests:  Three-view XR of the right shoulder yields no obvious fracture, malalignment, dislocation.  Essentially negative XR right shoulder.    Assessment and Plan:    1. Acute pain of right shoulder  - XR Shoulder 2+ View Right    2. Sprain of acromioclavicular joint, right, initial encounter    I discussed with this patient that I feel he has had a mild AC joint sprain.  He likely continues to have aggravated with exercise.  I recommend he discontinue any overhead pushing or pressing for the next 4 weeks.  I will send in a prescription for meloxicam for anti-inflammatory benefit.  RTC at that time if pain persists, or sooner if pain  "changes or worsens.    Adan REEDER \"Chance\" Daniel NAPOLES DO, CAQSM  01/22/21  10:05 EST    Disclaimer: Please note that areas of this note were completed with computer voice recognition software.  Quite often unanticipated grammatical, syntax, homophones, and other interpretive errors are inadvertently transcribed by the computer software. Please excuse any errors that have escaped final proofreading.  "